# Patient Record
Sex: MALE | Race: WHITE
[De-identification: names, ages, dates, MRNs, and addresses within clinical notes are randomized per-mention and may not be internally consistent; named-entity substitution may affect disease eponyms.]

---

## 2018-04-12 ENCOUNTER — HOSPITAL ENCOUNTER (OUTPATIENT)
Dept: HOSPITAL 52 - LL.SDS | Age: 83
Discharge: HOME | End: 2018-04-12
Attending: SURGERY
Payer: MEDICARE

## 2018-04-12 VITALS — DIASTOLIC BLOOD PRESSURE: 82 MMHG | SYSTOLIC BLOOD PRESSURE: 133 MMHG

## 2018-04-12 DIAGNOSIS — Z79.899: ICD-10-CM

## 2018-04-12 DIAGNOSIS — K64.8: ICD-10-CM

## 2018-04-12 DIAGNOSIS — E03.9: ICD-10-CM

## 2018-04-12 DIAGNOSIS — K63.5: Primary | ICD-10-CM

## 2018-04-12 DIAGNOSIS — Z96.652: ICD-10-CM

## 2018-04-12 DIAGNOSIS — Z79.82: ICD-10-CM

## 2018-04-12 DIAGNOSIS — K60.2: ICD-10-CM

## 2018-04-12 DIAGNOSIS — E78.5: ICD-10-CM

## 2018-04-12 DIAGNOSIS — Z98.890: ICD-10-CM

## 2018-04-12 PROCEDURE — 45388 COLONOSCOPY W/ABLATION: CPT

## 2018-04-12 PROCEDURE — 00812 ANES LWR INTST SCR COLSC: CPT

## 2018-04-12 NOTE — OR
Date of Procedure:  04/12/2018

 

PREOPERATIVE DIAGNOSIS:  Hematochezia.

 

POSTOPERATIVE DIAGNOSES:  Colon polyp, internal hemorrhoids, anal fissure.

 

OPERATIONS PERFORMED:  Colonoscopy with polyp ablation and anoscopy.

 

INDICATIONS FOR SURGERY:  This 87-year-old male has been having symptoms of

hematochezia.  He is referred for a colonoscopy.

 

FINDINGS:  In the patient's colon, single polyp was noted in the cecum.  This

was 3 to 4 mm in size, sessile in configuration, and benign in appearance.  The

patient's rectum had multiple small-to-medium sized internal hemorrhoids without

active bleeding.  Also, in the rectum, was noted a moderate-sized acute fissure

in the posterior midline.

 

DESCRIPTION OF PROCEDURE:  The patient was taken to the operating room.  He was

given intravenous sedation, and with him in the left lateral decubitus position,

digital rectal exam was performed showing no rectal masses.  The Olympus

colonoscope was inserted into the rectum.  Retroflexed examination of the rectal

canal was performed.  The scope was then carefully advanced under direct

visualization through the entire length of the colon until the cecum was

reached.  Cecal acquisition was confirmed by noting the normal internal cecal

anatomy including the appendiceal orifice and ileocecal valve.  In the cecum,

the above-described small polyp was noted.  This small polyp was destroyed with

the application of cautery using a cautery snare.  The scope was then slowly

withdrawn sequentially re-examining the colonic segments until the entire colon

and rectum had been fully examined.  An anoscope was selected and the patient's

anus was carefully examined with the anoscope and it was with this that the anal

fissure was identified.  The scope was removed and the patient then left the

operating room in satisfactory condition.

 

ESTIMATED BLOOD LOSS:  Zero.

 

COMPLICATIONS:  None.

 

PROGNOSIS:  Good.

 

BELLA Wiseman MD

DD:  04/12/2018 15:31:28

DT:  04/12/2018 15:56:49

Job #:  [id number]

## 2018-04-12 NOTE — PCM.PN
- General Info


Date of Service: 04/12/18





- Review of Systems


Systems Review Comment:: 





87-year-old male referred by Brianna Mata for colonoscopy. He has a history of 

rectal bleeding. He is medically stable to proceed today with no significant 

recent changes in his health status. His recent history and physical is 

reviewed. I have discussed the proposed colonoscopy with the patient. Risks 

such as but not limited to bleeding and GI injury reviewed. He appears to 

understand and agrees to proceed.





- Patient Data


Vitals - Most Recent: 


 Last Vital Signs











Temp  98.3 F   04/12/18 14:09


 


Pulse  51 L  04/12/18 14:09


 


Resp  20   04/12/18 14:09


 


BP  132/76   04/12/18 14:09


 


Pulse Ox  95   04/12/18 14:09











Weight - Most Recent: 63.503 kg


Med Orders - Current: 


 Current Medications





Lactated Ringer's (Ringers, Lactated)  1,000 mls @ 125 mls/hr IV ASDIRECTED BRIDGET


   Last Admin: 04/12/18 14:20 Dose:  125 mls/hr


Sodium Chloride (Saline Flush)  10 ml FLUSH ASDIRECTED PRN


   PRN Reason: Keep Vein Open





Discontinued Medications





Fentanyl (Sublimaze) Confirm Administered Dose 100 mcg .ROUTE .STK-MED ONE


   Stop: 04/12/18 07:39


Ketamine HCl (Ketalar) Confirm Administered Dose 500 mg .ROUTE .STK-MED ONE


   Stop: 04/12/18 07:39


Midazolam HCl (Versed 1 Mg/Ml) Confirm Administered Dose 2 mg .ROUTE .STK-MED 

ONE


   Stop: 04/12/18 07:39


Midazolam HCl (Versed 1 Mg/Ml) Confirm Administered Dose 2 mg .ROUTE .STK-MED 

ONE


   Stop: 04/12/18 14:43


Propofol (Diprivan  20 Ml) Confirm Administered Dose 200 mg .ROUTE .STK-MED ONE


   Stop: 04/12/18 07:40


Propofol (Diprivan  20 Ml) Confirm Administered Dose 400 mg .ROUTE .STK-MED ONE


   Stop: 04/12/18 14:43











- Problem List Review


Problem List Initiated/Reviewed/Updated: Yes





- Assessment


Assessment:: 





Hematochezia





- Plan


Plan:: 





Colonoscopy

## 2018-04-12 NOTE — PCM.OPNOTE
- General Post-Op/Procedure Note


Date of Surgery/Procedure: 04/12/18


Operative Procedure(s): Colonoscopy with polyp ablation.  Anoscopy


Findings: 





Small cecal polyp


Internal hemorrhoids


Anal fissure in the posterior midline


Pre Op Diagnosis: Hematochezia


Post-Op Diagnosis: Colon polyp.  Hemorrhoids.  Anal fissure


Anesthesia Technique: MAC


Primary Surgeon: Zach Wiseman


Pathology: 





none


Output, Urine Amount: 0


EBL in mLs: 0


Complications: None


Condition: Good

## 2018-09-12 ENCOUNTER — HOSPITAL ENCOUNTER (OUTPATIENT)
Dept: HOSPITAL 52 - LL.ED | Age: 83
Setting detail: OBSERVATION
Discharge: HOME | End: 2018-09-12
Attending: FAMILY MEDICINE | Admitting: EMERGENCY MEDICINE
Payer: MEDICARE

## 2018-09-12 VITALS — SYSTOLIC BLOOD PRESSURE: 132 MMHG | DIASTOLIC BLOOD PRESSURE: 58 MMHG

## 2018-09-12 DIAGNOSIS — R10.11: Primary | ICD-10-CM

## 2018-09-12 DIAGNOSIS — K62.89: ICD-10-CM

## 2018-09-12 LAB
CHLORIDE SERPL-SCNC: 101 MMOL/L (ref 98–107)
SODIUM SERPL-SCNC: 135 MMOL/L (ref 136–145)

## 2018-09-12 PROCEDURE — 74019 RADEX ABDOMEN 2 VIEWS: CPT

## 2018-09-12 PROCEDURE — 36415 COLL VENOUS BLD VENIPUNCTURE: CPT

## 2018-09-12 PROCEDURE — 81001 URINALYSIS AUTO W/SCOPE: CPT

## 2018-09-12 PROCEDURE — G0378 HOSPITAL OBSERVATION PER HR: HCPCS

## 2018-09-12 PROCEDURE — 82272 OCCULT BLD FECES 1-3 TESTS: CPT

## 2018-09-12 PROCEDURE — 85025 COMPLETE CBC W/AUTO DIFF WBC: CPT

## 2018-09-12 PROCEDURE — 99285 EMERGENCY DEPT VISIT HI MDM: CPT

## 2018-09-12 PROCEDURE — 80053 COMPREHEN METABOLIC PANEL: CPT

## 2018-09-12 PROCEDURE — 36000 PLACE NEEDLE IN VEIN: CPT

## 2018-09-12 PROCEDURE — 74177 CT ABD & PELVIS W/CONTRAST: CPT

## 2018-09-12 PROCEDURE — 76705 ECHO EXAM OF ABDOMEN: CPT

## 2018-09-12 NOTE — EDM.PDOC
ED HPI GENERAL MEDICAL PROBLEM





- General


Chief Complaint: Gastrointestinal Problem


Stated Complaint: abdominal pain started at 0400 today


Time Seen by Provider: 09/12/18 08:30


Source of Information: Reports: Patient


History Limitations: Reports: No Limitations





- History of Present Illness


INITIAL COMMENTS - FREE TEXT/NARRATIVE: 





Patient comes in with complaint of abdominal pain that started around 4 hours 

previously.  Cannot describe pain when asked if crampy or sharp in nature.  

Says it is constant.  





No accompanying other complaints.  Denies fevers/chills/nausea/bowel changes.  

Denies feeling constipated. Had last bowel movement yesterday afternoon. 


No HEENT/Resp/CV complaints.  No chest discomfort. Pain does not radiate toward 

chest or back. No changes in urination/hematuria. No neuro complaints. 


  ** Abdominal


Pain Score (Numeric/FACES): 8





- Related Data


 Allergies











Allergy/AdvReac Type Severity Reaction Status Date / Time


 


No Known Allergies Allergy   Verified 09/12/18 07:52











Home Meds: 


 Home Meds





Aspirin 81 mg PO DAILY 06/03/16 [History]


Doxazosin [Cardura] 4 mg PO BEDTIME 06/03/16 [History]


Levothyroxine Sodium [Synthroid] 25 mcg PO ACBREAKFAST 04/11/18 [History]


Mineral Oil/Petrolat/Phenyleph [Preparation H Oint] 1 applic RC ASDIRECTED PRN 

04/11/18 [History]


Omega-3/DHA/Epa/Fish Oil [Fish Oil 1,000 mg Softgel] 1 each PO DAILY 04/11/18 [

History]


Hydrocortisone [Hydrocortisone 2.5% Crm] 30 gm TOP BID #1 tube 04/12/18 [Rx]


Psyllium Husk/Aspartame [Metamucil Fiber Singles Packet] 3.4 gm PO DAILY 09/12/ 18 [History]











Past Medical History


HEENT History: Reports: Hard of Hearing, Impaired Vision


Other HEENT History: wears glasses.  has full upper detures, partial lower 

denture.  Bilateral hearing aides


Cardiovascular History: Reports: None


Respiratory History: Reports: None


Gastrointestinal History: Reports: None


Genitourinary History: Reports: Prostate Disorder


Musculoskeletal History: Reports: Arthritis


Neurological History: Reports: None


Psychiatric History: Reports: None


Endocrine/Metabolic History: Reports: Hypothyroidism


Hematologic History: Reports: None


Immunologic History: Reports: None


Oncologic (Cancer) History: Reports: None


Dermatologic History: Reports: None





- Past Surgical History


Male  Surgical History: Reports: Prostate Biopsy


Musculoskeletal Surgical History: Reports: Knee Replacement





Social & Family History





- Tobacco Use


Smoking Status *Q: Never Smoker


Second Hand Smoke Exposure: No





- Caffeine Use


Caffeine Use: Reports: Coffee





- Recreational Drug Use


Recreational Drug Use: No





ED ROS GENERAL





- Review of Systems


Review Of Systems: ROS reveals no pertinent complaints other than HPI.





ED EXAM, GI/ABD





- Physical Exam


Exam: See Below


Exam Limited By: No Limitations


General Appearance: Alert, WD/WN, Other (appears uncomfortable)


Eyes: Bilateral: Normal Appearance, EOMI


Ears: Normal External Exam


Nose: Normal Inspection


Throat/Mouth: Normal Lips, Normal Voice, No Airway Compromise


Head: Atraumatic, Normocephalic


Neck: Supple, Non-Tender


Respiratory/Chest: No Respiratory Distress, Lungs Clear, Normal Breath Sounds, 

No Accessory Muscle Use, Chest Non-Tender


Cardiovascular: Regular Rate, Rhythm, No Murmur


GI/Abdominal Exam: Normal Bowel Sounds, No Distention, No Abnormal Bruit, 

Guarding (mild guarding when palpated RUQ/epigastric area), Tender (RUQ/

epigastric region).  No: Rebound


 (Male) Exam: Deferred


Rectal (Males) Exam: Deferred


Back Exam: No: CVA Tenderness (L), CVA Tenderness (R)


Extremities: Normal Inspection


Neurological: Alert, Oriented, Normal Cognition, Other (equal tone/strength)


Psychiatric: Normal Affect, Normal Mood


Skin Exam: Warm, Intact, Normal Color





Course





- Vital Signs


Last Recorded V/S: 


 Last Vital Signs











Temp  36.6 C   09/12/18 07:51


 


Pulse  53 L  09/12/18 07:51


 


Resp  16   09/12/18 07:51


 


BP  155/76 H  09/12/18 07:51


 


Pulse Ox  93 L  09/12/18 07:51














- Orders/Labs/Meds


Orders: 


 Active Orders 24 hr











 Category Date Time Status


 


 Abdomen 2V AP Flat Upright [CR] Stat Exams  09/12/18 08:54 Taken


 


 Abdomen Pelvis w Cont [CT] Stat Exams  09/12/18 09:25 Ordered


 


 Iopamidol [Isovue-300 (61%)] Med  09/12/18 10:30 Once





 100 ml IVPUSH ONETIME ONE   


 


 Sodium Chloride 0.9% [Saline Flush] Med  09/12/18 09:42 Active





 10 ml FLUSH ASDIRECTED PRN   


 


 Saline Lock Insert [OM.PC] Routine Oth  09/12/18 09:42 Ordered








 Medication Orders





Iopamidol (Isovue-300 (61%))  100 ml IVPUSH ONETIME ONE


   Stop: 09/12/18 10:31


Sodium Chloride (Saline Flush)  10 ml FLUSH ASDIRECTED PRN


   PRN Reason: Keep Vein Open








Labs: 


 Laboratory Tests











  09/12/18 09/12/18 09/12/18 Range/Units





  07:59 08:05 08:05 


 


WBC    5.3  (4.0-10.2)  K/uL


 


RBC    4.68  (4.33-5.41)  M/uL


 


Hgb    15.1  (13.1-16.8)  g/dL


 


Hct    42.3  (39.0-49.0)  %


 


MCV    90.4  (84.0-98.0)  fL


 


MCH    32.3  (28.2-33.3)  pg


 


MCHC    35.7  (31.7-36.0)  g/dL


 


RDW    12.4  (11.2-14.1)  %


 


Plt Count    129 L  (150-350)  K/uL


 


Neut % (Auto)    69.9  (45.0-80.0)  %


 


Lymph % (Auto)    19.1  (10.0-50.0)  %


 


Mono % (Auto)    9.5  (2.0-14.0)  %


 


Eos % (Auto)    1.1  (0.0-5.0)  %


 


Baso % (Auto)    0.4  (0.0-2.0)  %


 


Neut # (Auto)    3.70  (1.40-7.00)  K/uL


 


Lymph # (Auto)    1.01  (0.50-3.50)  K/uL


 


Mono # (Auto)    0.50  (0.00-1.00)  K/uL


 


Eos # (Auto)    0.06  (0.00-0.50)  K/uL


 


Baso # (Auto)    0.02  (0.00-0.20)  K/uL


 


Sodium   135 L   (136-145)  mmol/L


 


Potassium   4.1   (3.5-5.1)  mmol/L


 


Chloride   101   ()  mmol/L


 


Carbon Dioxide   27.3   (21.0-32.0)  mmol/L


 


BUN   13   (7-18)  mg/dL


 


Creatinine   0.98   (0.51-1.17)  mg/dL


 


Est Cr Clr Drug Dosing   45.32   mL/min


 


Estimated GFR (MDRD)   > 60   mL/min


 


Glucose   117 H   ()  mg/dL


 


Calcium   8.9   (8.5-10.1)  mg/dL


 


Total Bilirubin   0.8   (0.2-1.0)  mg/dL


 


AST   27   (15-37)  U/L


 


ALT   30   (12-78)  U/L


 


Alkaline Phosphatase   78   ()  IU/L


 


Total Protein   7.2   (6.4-8.2)  g/dL


 


Albumin   3.5   (3.4-5.0)  g/dL


 


Specimen Type  Urinvoid    


 


Urine Color  Yellow    


 


Urine Appearance  Clear    


 


Urine pH  7.0    (5.0-9.0)  


 


Ur Specific Gravity  1.015    (1.005-1.030)  


 


Urine Protein  Negative    (NEGATIVE)  mg/dL


 


Urine Glucose (UA)  Negative    (NEGATIVE)  mg/dL


 


Urine Ketones  Negative    (NEGATIVE)  mg/dL


 


Urine Occult Blood  Negative    (NEGATIVE)  


 


Urine Nitrite  Negative    (NEGATIVE)  


 


Urine Bilirubin  Negative    (NEGATIVE)  


 


Urine Urobilinogen  0.2    (0.2-1.0)  E.U./dL


 


Ur Leukocyte Esterase  Negative    (NEGATIVE)  


 


Urine RBC  0-5    /HPF


 


Urine WBC  0-5    /HPF


 


Ur Epithelial Cells  Rare    /LPF


 


Urine Bacteria  Rare    (NONE TO FEW)  /HPF











Meds: 


Medications











Generic Name Dose Route Start Last Admin





  Trade Name Freq  PRN Reason Stop Dose Admin


 


Iopamidol  100 ml  09/12/18 10:30  





  Isovue-300 (61%)  IVPUSH  09/12/18 10:31  





  ONETIME ONE   





     





     





     





     


 


Sodium Chloride  10 ml  09/12/18 09:42  





  Saline Flush  FLUSH   





  ASDIRECTED PRN   





  Keep Vein Open   





     





     





     














Discontinued Medications














Generic Name Dose Route Start Last Admin





  Trade Name Freq  PRN Reason Stop Dose Admin


 


Ondansetron HCl  4 mg  09/12/18 09:23  09/12/18 09:28





  Zofran Odt  PO  09/12/18 09:24  4 mg





  ONETIME ONE   Administration





     





     





     





     


 


Oxycodone/Acetaminophen  1 tab  09/12/18 09:22  09/12/18 09:30





  Percocet 325-5 Mg  PO  09/12/18 09:23  1 tab





  ONETIME ONE   Administration





     





     





     





     














- Re-Assessments/Exams


Free Text/Narrative Re-Assessment/Exam: 





09/12/18 09:32


Labs unremarkable overall.  Abdominal film shows increased stool RUQ.  Given 

mild guarding/patient rating pain at a 9, abdominal CT ordered. Unable to 

schedule patient for US study this morning. 





09/12/18 09:57


Patient had emesis while trying to drink contrast. Plan at this time is to 

admit to floor on observation. Will reattempt to give PO contrast. Consider 

scheduling RUQ US later today to allow for more thorough evaluation. 





Departure





- Departure


Time of Disposition: 09:59


Disposition: Refer to Observation


Condition: Good


Clinical Impression: 


 Abdominal pain








- Discharge Information


*PRESCRIPTION DRUG MONITORING PROGRAM REVIEWED*: Not Applicable


*COPY OF PRESCRIPTION DRUG MONITORING REPORT IN PATIENT NII: Not Applicable


Referrals: 


Christina Curtis MD [Primary Care Provider] - 


Forms:  ED Department Discharge





- Problem List & Annotations


(1) Abdominal pain


SNOMED Code(s): 73118094


   Code(s): R10.9 - UNSPECIFIED ABDOMINAL PAIN   Status: Acute   Priority: High

   Onset Date: 09/12/18   Annotation/Comment:: RUQ present since about 4am this 

morning. Denies feeling constipated however there was a larger collection of 

stool noted in this area on plain xray. No fevers. Normal WBC and LFTs. Unable 

to obtain US study until midday. Will also continue to see if patient can drink 

contrast in order to have CT study of abdomen/pelvis.    





- Problem List Review


Problem List Initiated/Reviewed/Updated: Yes





- My Orders


Last 24 Hours: 


My Active Orders





09/12/18 08:54


Abdomen 2V AP Flat Upright [CR] Stat 





09/12/18 09:25


Abdomen Pelvis w Cont [CT] Stat 





09/12/18 09:42


Sodium Chloride 0.9% [Saline Flush]   10 ml FLUSH ASDIRECTED PRN 


Saline Lock Insert [OM.PC] Routine 





09/12/18 10:30


Iopamidol [Isovue-300 (61%)]   100 ml IVPUSH ONETIME ONE 














- Assessment/Plan


Admission H&P: Please use this note as an admission H&P


Last 24 Hours: 


My Active Orders





09/12/18 08:54


Abdomen 2V AP Flat Upright [CR] Stat 





09/12/18 09:25


Abdomen Pelvis w Cont [CT] Stat 





09/12/18 09:42


Sodium Chloride 0.9% [Saline Flush]   10 ml FLUSH ASDIRECTED PRN 


Saline Lock Insert [OM.PC] Routine 





09/12/18 10:30


Iopamidol [Isovue-300 (61%)]   100 ml IVPUSH ONETIME ONE 











Assessment:: 





RUQ abdominal pain.  May be due to constipation. Differential includes 

gallbladder disease, early VGE, diverticular disease. 


Plan: 





As above.

## 2018-09-12 NOTE — PCM.DCSUM1
**Discharge Summary





- Hospital Course


Brief History: Patient admitted observation due to RUQ pain complaint. This 

started this morning around 4am. Patient was planning to see primary provider 

today and was fasting for routine labs.


Diagnosis: Stroke: No





- Discharge Data


Discharge Date: 09/12/18


Discharge Disposition: Home, Self-Care 01


Condition: Good





- Discharge Diagnosis/Problem(s)


(1) Abdominal pain


SNOMED Code(s): 21509856


   ICD Code: R10.9 - UNSPECIFIED ABDOMINAL PAIN   Status: Acute   Priority: 

High   Current Visit: No   Onset Date: 09/12/18   Problem Details: Improved 

gradually throughout the day and currently pain-free. Able to tolerate dinner 

tray without issue. Negative RUQ US study.  CT of abdomen/pelvis showed small 

area of inflammed ileum per Radiology. Differential included viral etiology. 

WBC normal. No fevers. No further emesis once admitted to floor. No bowel 

changes.  UA and Chemistry unremarkable. Has history of intermittent chronic 

constipation.    





(2) Rectal nodule


SNOMED Code(s): 711731359, 365263027


   ICD Code: K62.89 - OTHER SPECIFIED DISEASES OF ANUS AND RECTUM   Status: 

Acute   Priority: Medium   Current Visit: Yes   Problem Details: Patient shared 

that he has noted some dark blood on TP as well as on stools intermittently 

over the past month.  Feels this may have been associated with passing hard 

constipated stools. Digital rectal exam performed and nodule noted on left side 

of center posteriorly. Non-tender. Review of records shows that last colonscopy 

was performed April and multiple small/medium internal hemorrhoids were noted 

at that time. This may be a thrombosed hemorrhoid or could represent prostate 

nodule.  Will have patient follow up this week with primary provider for 

further evaluation and referral as needed.    





- Patient Summary/Data


Complications: none


Hospital Course: 





Pain had improved from a 9 to a 4 by the time patient was moved from the ER to 

a room.  Pain continued to improve over the afternoon and finally became a "0". 

Patient kept NPO except for water until the afternoon.  Once CT and US results 

were available diet was advanced as tolerated. He was able to eat a regular 

meal at dinner time. Pain did not return. Vital signs remained stable.  No new 

symptoms noted. Suspect mild viral gastroenteritis given the small area of 

inflamed ileum noted on CT. However cannot rule out other potential causes. 

Patient did share that he has had some darker blood mixed in with stools at 

times (see above). Negative for occult blood tonight. Small nodule noted on 

rectal exam (see above).  Uncertain if it represents a small thrombosed 

hemorrhoid or new prostate nodule. Patient plans on following up with primary 

provider at [facility] within the next few days in regards to this.  He may need 

referral to specialty clinic for further evaluation. 





- Patient Instructions


Diet: Usual Diet as Tolerated


Activity: As Tolerated


Driving: Do Not Drive


Showering/Bathing: May Shower


Notify Provider of: Increased Pain


Other/Special Instructions: Follow up with [facility] later this week for recheck and 

in regards to the rectal bleeding.  You may try taking a dose of Magnesium 

Citrate if you continue to feel constipated and do not have a stool tomorrow.





- Discharge Plan


*PRESCRIPTION DRUG MONITORING PROGRAM REVIEWED*: Not Applicable


*COPY OF PRESCRIPTION DRUG MONITORING REPORT IN PATIENT NII: Not Applicable


Home Medications: 


 Home Meds





Aspirin 81 mg PO DAILY 06/03/16 [History]


Doxazosin [Cardura] 4 mg PO BEDTIME 06/03/16 [History]


Levothyroxine Sodium [Synthroid] 25 mcg PO ACBREAKFAST 04/11/18 [History]


Mineral Oil/Petrolat/Phenyleph [Preparation H Oint] 1 applic RC ASDIRECTED PRN 

04/11/18 [History]


Omega-3/DHA/Epa/Fish Oil [Fish Oil 1,000 mg Softgel] 1 each PO DAILY 04/11/18 [

History]


Hydrocortisone [Hydrocortisone 2.5% Crm] 30 gm TOP BID #1 tube 04/12/18 [Rx]


Psyllium Husk/Aspartame [Metamucil Fiber Singles Packet] 3.4 gm PO DAILY 09/12/ 18 [History]








Forms:  ED Department Discharge


Referrals: 


Sheets-Christina Chapa MD [Primary Care Provider] - 





- Discharge Summary/Plan Comment


DC Time >30 min.: No





- General Info


Date of Service: 09/12/18


Admission Dx/Problem (Free Text: 





RUQ pain


Subjective Update: 





Symptoms have resolved. 


Functional Status: Reports: Pain Controlled, Tolerating Diet, Ambulating, 

Urinating.  Denies: New Symptoms





- Review of Systems


General: Reports: No Symptoms


HEENT: Reports: Glasses


Pulmonary: Reports: No Symptoms


Cardiovascular: Reports: No Symptoms


Gastrointestinal: Reports: No Symptoms.  Denies: Abdominal Pain, Diarrhea, 

Difficulty Swallowing


Genitourinary: Reports: No Symptoms


Musculoskeletal: Reports: No Symptoms


Skin: Reports: No Symptoms


Neurological: Reports: No Symptoms


Psychiatric: Reports: No Symptoms





- Patient Data


Vitals - Most Recent: 


 Last Vital Signs











Temp  35.7 C   09/12/18 16:00


 


Pulse  57 L  09/12/18 16:00


 


Resp  16   09/12/18 16:00


 


BP  132/58 L  09/12/18 16:00


 


Pulse Ox  95   09/12/18 16:00











Weight - Most Recent: 64.229 kg


I&O - Last 24 hours: 


 Intake & Output











 09/12/18 09/12/18 09/12/18





 06:59 14:59 22:59


 


Intake Total   240


 


Balance   240











Lab Results - Last 24 hrs: 


 Laboratory Results - last 24 hr











  09/12/18 09/12/18 09/12/18 Range/Units





  07:59 08:05 08:05 


 


WBC    5.3  (4.0-10.2)  K/uL


 


RBC    4.68  (4.33-5.41)  M/uL


 


Hgb    15.1  (13.1-16.8)  g/dL


 


Hct    42.3  (39.0-49.0)  %


 


MCV    90.4  (84.0-98.0)  fL


 


MCH    32.3  (28.2-33.3)  pg


 


MCHC    35.7  (31.7-36.0)  g/dL


 


RDW    12.4  (11.2-14.1)  %


 


Plt Count    129 L  (150-350)  K/uL


 


Neut % (Auto)    69.9  (45.0-80.0)  %


 


Lymph % (Auto)    19.1  (10.0-50.0)  %


 


Mono % (Auto)    9.5  (2.0-14.0)  %


 


Eos % (Auto)    1.1  (0.0-5.0)  %


 


Baso % (Auto)    0.4  (0.0-2.0)  %


 


Neut # (Auto)    3.70  (1.40-7.00)  K/uL


 


Lymph # (Auto)    1.01  (0.50-3.50)  K/uL


 


Mono # (Auto)    0.50  (0.00-1.00)  K/uL


 


Eos # (Auto)    0.06  (0.00-0.50)  K/uL


 


Baso # (Auto)    0.02  (0.00-0.20)  K/uL


 


Sodium   135 L   (136-145)  mmol/L


 


Potassium   4.1   (3.5-5.1)  mmol/L


 


Chloride   101   ()  mmol/L


 


Carbon Dioxide   27.3   (21.0-32.0)  mmol/L


 


BUN   13   (7-18)  mg/dL


 


Creatinine   0.98   (0.51-1.17)  mg/dL


 


Est Cr Clr Drug Dosing   45.32   mL/min


 


Estimated GFR (MDRD)   > 60   mL/min


 


Glucose   117 H   ()  mg/dL


 


Calcium   8.9   (8.5-10.1)  mg/dL


 


Total Bilirubin   0.8   (0.2-1.0)  mg/dL


 


AST   27   (15-37)  U/L


 


ALT   30   (12-78)  U/L


 


Alkaline Phosphatase   78   ()  IU/L


 


Total Protein   7.2   (6.4-8.2)  g/dL


 


Albumin   3.5   (3.4-5.0)  g/dL


 


Specimen Type  Urinvoid    


 


Urine Color  Yellow    


 


Urine Appearance  Clear    


 


Urine pH  7.0    (5.0-9.0)  


 


Ur Specific Gravity  1.015    (1.005-1.030)  


 


Urine Protein  Negative    (NEGATIVE)  mg/dL


 


Urine Glucose (UA)  Negative    (NEGATIVE)  mg/dL


 


Urine Ketones  Negative    (NEGATIVE)  mg/dL


 


Urine Occult Blood  Negative    (NEGATIVE)  


 


Urine Nitrite  Negative    (NEGATIVE)  


 


Urine Bilirubin  Negative    (NEGATIVE)  


 


Urine Urobilinogen  0.2    (0.2-1.0)  E.U./dL


 


Ur Leukocyte Esterase  Negative    (NEGATIVE)  


 


Urine RBC  0-5    /HPF


 


Urine WBC  0-5    /HPF


 


Ur Epithelial Cells  Rare    /LPF


 


Urine Bacteria  Rare    (NONE TO FEW)  /HPF











Med Orders - Current: 


 Current Medications





Acetaminophen (Tylenol)  650 mg PO Q6H PRN


   PRN Reason: Pain


Aspirin (Aspirin)  81 mg PO DAILY BRIDGET


Doxazosin Mesylate (Cardura)  4 mg PO BEDTIME BRIDGET


Levothyroxine Sodium (Levothyroxine)  25 mcg PO ACBREAKFAST BRIDGET


Ondansetron HCl (Zofran)  4 mg IVPUSH Q6H PRN


   PRN Reason: Nausea/Vomiting


Phenyleph/Shark Oil/Min Oil/Petrol (Preparation H Oint)  1 gm RECTAL ASDIRECTED 

PRN


   PRN Reason: Hemorrhoids


Sodium Chloride (Saline Flush)  10 ml FLUSH ASDIRECTED PRN


   PRN Reason: Keep Vein Open


Sodium Chloride (Saline Flush)  10 ml FLUSH ASDIRECTED PRN


   PRN Reason: Keep Vein Open


Tramadol HCl (Ultram)  50 mg PO Q4H PRN


   PRN Reason: Pain





Discontinued Medications





Iopamidol (Isovue-300 (61%))  100 ml IVPUSH ONETIME ONE


   Stop: 09/12/18 10:31


   Last Admin: 09/12/18 11:56 Dose:  100 ml


Ondansetron HCl (Zofran Odt)  4 mg PO ONETIME ONE


   Stop: 09/12/18 09:24


   Last Admin: 09/12/18 09:28 Dose:  4 mg


Oxycodone/Acetaminophen (Percocet 325-5 Mg)  1 tab PO ONETIME ONE


   Stop: 09/12/18 09:23


   Last Admin: 09/12/18 09:30 Dose:  1 tab











- Exam


General: Reports: Alert, Oriented, Cooperative, No Acute Distress


HEENT: Reports: Pupils Equal, Pupils Reactive, EOMI, Mucous Membr. Moist/Pink


Neck: Reports: Supple


Lungs: Reports: Clear to Auscultation, Normal Respiratory Effort


Cardiovascular: Reports: Regular Rate, Regular Rhythm


GI/Abdominal Exam: Normal Bowel Sounds, Soft, Non-Tender, No Distention, No Mass


 (Male) Exam: Deferred


Rectal (Males) Exam: BPH, Heme - Stool, Prostate Nodule (vs thrombosed internal 

hemorrhoid).  No: Tenderness


Extremities: Normal Inspection


Skin: Reports: Warm, Dry, Intact


Neurological: Reports: No New Focal Deficit


Psy/Mental Status: Reports: Alert, Normal Affect, Normal Mood

## 2019-01-28 ENCOUNTER — TRANSFERRED RECORDS (OUTPATIENT)
Dept: HEALTH INFORMATION MANAGEMENT | Facility: CLINIC | Age: 84
End: 2019-01-28

## 2019-01-29 ENCOUNTER — TRANSFERRED RECORDS (OUTPATIENT)
Dept: HEALTH INFORMATION MANAGEMENT | Facility: CLINIC | Age: 84
End: 2019-01-29

## 2019-02-06 ENCOUNTER — TRANSFERRED RECORDS (OUTPATIENT)
Dept: HEALTH INFORMATION MANAGEMENT | Facility: CLINIC | Age: 84
End: 2019-02-06

## 2019-02-09 ENCOUNTER — HOSPITAL ENCOUNTER (EMERGENCY)
Dept: HOSPITAL 52 - LL.ED | Age: 84
Discharge: HOME | End: 2019-02-09
Payer: MEDICARE

## 2019-02-09 VITALS — DIASTOLIC BLOOD PRESSURE: 59 MMHG | SYSTOLIC BLOOD PRESSURE: 157 MMHG

## 2019-02-09 DIAGNOSIS — N99.89: Primary | ICD-10-CM

## 2019-02-09 DIAGNOSIS — Z79.899: ICD-10-CM

## 2019-02-09 DIAGNOSIS — R33.9: ICD-10-CM

## 2019-02-09 DIAGNOSIS — E03.9: ICD-10-CM

## 2019-02-09 NOTE — EDM.PDOC
ED HPI GENERAL MEDICAL PROBLEM





- General


Chief Complaint: Genitourinary Problem


Stated Complaint: Urinary retention/urge


Time Seen by Provider: 02/09/19 11:08


Source of Information: Reports: Patient, Family


History Limitations: Reports: No Limitations





- History of Present Illness


INITIAL COMMENTS - FREE TEXT/NARRATIVE: 





Patient complains of urinary retention after taking Goetz cath out yesterday 

post-procedure.  He had the Goetz in place for several days after undergoing 

scope in [place] by Urology.  He had a small tumor scraped from his urethra at 

that time. 


No fevers/chills.  Feels like his bladder if full.  Can only void small amount 

with straining. 


Denies any other acute complaints. 


  ** Lower Abdominal


Pain Score (Numeric/FACES): 7





- Related Data


 Allergies











Allergy/AdvReac Type Severity Reaction Status Date / Time


 


No Known Allergies Allergy   Verified 02/09/19 10:58











Home Meds: 


 Home Meds





Doxazosin [Cardura] 4 mg PO BEDTIME 06/03/16 [History]


Levothyroxine Sodium [Synthroid] 25 mcg PO ACBREAKFAST 04/11/18 [History]


Mineral Oil/Petrolat/Phenyleph [Preparation H Oint] 1 applic RC ASDIRECTED PRN 

04/11/18 [History]


Omega-3/DHA/Epa/Fish Oil [Fish Oil 1,000 mg Softgel] 1 each PO DAILY 04/11/18 [

History]


Dutasteride [Avodart] 0.5 mg PO DAILY #30 cap 12/12/18 [Rx]











Past Medical History


HEENT History: Reports: Hard of Hearing


Other HEENT History: wears glasses.  has full upper detures, partial lower 

denture.  Bilateral hearing aides


Cardiovascular History: Reports: High Cholesterol, Other (See Below)


Other Cardiovascular History: First degree AV block.  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 

infarc


Respiratory History: Reports: COPD, Other (See Below)


Other Respiratory History: Left lung nodules x2


Gastrointestinal History: Reports: GERD, Hemorrhoids, Other (See Below)


Other Gastrointestinal History: Elevated bilirubin.  Blood in stool.  Anal 

fissure


Genitourinary History: Reports: BPH, Other (See Below)


Other Genitourinary History: Gross hematuria.  Small growth in urethra removed 

Feb 2019


Musculoskeletal History: Reports: Arthritis, Osteoporosis


Neurological History: Reports: None


Psychiatric History: Reports: None


Endocrine/Metabolic History: Reports: Hypothyroidism, Other (See Below)


Other Endocrine/Metabolic History: Hyperglycemia


Hematologic History: Reports: None


Immunologic History: Reports: None


Oncologic (Cancer) History: Reports: None


Dermatologic History: Reports: Other (See Below)


Other Dermatologic History: Other seborrheic keratosis





- Past Surgical History


HEENT Surgical History: Reports: Other (See Below)


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 

upper ear


GI Surgical History: Reports: Colonoscopy, Hernia Repair/Other


Male  Surgical History: Reports: Prostate Biopsy


Musculoskeletal Surgical History: Reports: Knee Replacement





Social & Family History





- Tobacco Use


Smoking Status *Q: Never Smoker


Second Hand Smoke Exposure: No





- Caffeine Use


Caffeine Use: Reports: Coffee





- Recreational Drug Use


Recreational Drug Use: No





ED ROS GENERAL





- Review of Systems


Review Of Systems: See Below


Constitutional: Reports: No Symptoms


HEENT: Reports: No Symptoms, Glasses


Respiratory: Reports: No Symptoms


Cardiovascular: Reports: No Symptoms


GI/Abdominal: Reports: No Symptoms.  Denies: Abdominal Pain, Constipation, 

Diarrhea, Nausea, Vomiting


: Reports: Other (see HPI)


Musculoskeletal: Reports: No Symptoms (no acute changes from baseline)


Skin: Reports: No Symptoms


Neurological: Reports: No Symptoms


Psychiatric: Reports: No Symptoms





ED EXAM, RENAL/





- Physical Exam


Exam: See Below


Exam Limited By: No Limitations


General Appearance: Alert, WD/WN, No Apparent Distress


Eye Exam: Bilateral Eye: EOMI, PERRL


Nose: No: Nasal Drainage


Throat/Mouth: Normal Voice, No Airway Compromise


Head: Atraumatic, Normocephalic


Neck: Supple


Respiratory/Chest: No Respiratory Distress, No Accessory Muscle Use, Chest Non-

Tender, Other (faint rales at bases)


Cardiovascular: Regular Rate, Rhythm, No Murmur


GI/Abdominal: Normal Bowel Sounds, Soft, Non-Tender


 (Male) Exam: Other (normal external male genitalia, no swelling noted)


Rectal (Males) Exam: Deferred


Extremities: Normal Capillary Refill


Neurological: Alert, Normal Cognition


Psychiatric: Normal Affect, Normal Mood


Skin Exam: Warm, Dry





Course





- Vital Signs


Last Recorded V/S: 


 Last Vital Signs











Temp  36.7 C   02/09/19 11:37


 


Pulse  60   02/09/19 11:37


 


Resp  18   02/09/19 11:37


 


BP  157/59 H  02/09/19 11:37


 


Pulse Ox  97   02/09/19 11:37














- Orders/Labs/Meds


Labs: 


 Laboratory Tests











  02/09/19 02/09/19 Range/Units





  11:07 11:20 


 


Specimen Type  Cancelled  Urincath  


 


Urine Color  Cancelled  Bebe  


 


Urine Appearance  Cancelled  Slightly cloudy  


 


Urine pH  Cancelled  6.5  


 


Ur Specific Gravity  Cancelled  1.010  


 


Urine Protein  Cancelled  Negative  


 


Urine Glucose (UA)  Cancelled  Negative  


 


Urine Ketones  Cancelled  Negative  


 


Urine Occult Blood  Cancelled  Large H  


 


Urine Nitrite  Cancelled  Negative  


 


Urine Bilirubin  Cancelled  Negative  


 


Urine Urobilinogen  Cancelled  0.2  


 


Ur Leukocyte Esterase  Cancelled  Trace H  


 


Urine RBC  Cancelled  20-30 H  


 


Urine WBC  Cancelled  0-5  


 


Ur Epithelial Cells  Cancelled  Occasional  


 


Other Crystals  Cancelled   


 


Amorphous Sediment  Cancelled   


 


Urine Bacteria  Cancelled  Occasional  


 


Hyaline Casts  Cancelled   


 


Granular Casts  Cancelled   


 


Urine Mucus  Cancelled   


 


Urine Other  Cancelled   


 


Urine Yeast  Cancelled   


 


Urinalysis Comment  Cancelled   














- Re-Assessments/Exams


Free Text/Narrative Re-Assessment/Exam: 





02/09/19 12:08


Bladder scan showed approximately 1liter of retained urine. This was drained 

after placement of new Goetz.  UA showed some RBCs, but no evidence of UTI. 





Plan at this time is to have the patient keep the catheter in place for two 

days. To call patient's Urology office early Monday morning to update them.  

They are to follow Urology guidance as to when to again attempt to remove the 

Goetz. Suspect that patient will nee to keep this one in place 2-3 days.


Precautions reviewed. To follow up here in [place] as needed. 





Departure





- Departure


Time of Disposition: 11:58


Disposition: Home, Self-Care 01


Condition: Good


Clinical Impression: 


 Postoperative urinary retention








- Discharge Information


*PRESCRIPTION DRUG MONITORING PROGRAM REVIEWED*: Not Applicable


*COPY OF PRESCRIPTION DRUG MONITORING REPORT IN PATIENT NII: Not Applicable


Instructions:  Acute Urinary Retention, Male, Easy-to-Read


Referrals: 


Brianna Mata PA [Primary Care Provider] - 


Forms:  ED Department Discharge


Additional Instructions: 


We will have you keep the indwelling catheter in place for the next 48 hours. 


Call the Urology department in [place] Monday morning and inform them of the need 

to replace the Goetz.  Ask them how long they wish for you to keep this one in 

place.  They may have you take it out Monday or Tuesday most likely.


If you have any problems or concerns feel free to return to [place] for a 

recheck.

## 2019-02-27 ENCOUNTER — TRANSFERRED RECORDS (OUTPATIENT)
Dept: HEALTH INFORMATION MANAGEMENT | Facility: CLINIC | Age: 84
End: 2019-02-27

## 2019-05-02 ENCOUNTER — TRANSFERRED RECORDS (OUTPATIENT)
Dept: HEALTH INFORMATION MANAGEMENT | Facility: CLINIC | Age: 84
End: 2019-05-02

## 2019-06-21 ENCOUNTER — TRANSFERRED RECORDS (OUTPATIENT)
Dept: HEALTH INFORMATION MANAGEMENT | Facility: CLINIC | Age: 84
End: 2019-06-21

## 2019-07-17 NOTE — TELEPHONE ENCOUNTER
I spoke to Melvina (his daughter, not wife) and she chose to schedule him for 8/19/19 with Dr Castro at the [facility]. She said they are not available to come on 7/29/19 due to other appts, and the week of August 19th works best for them.    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     Has the patient been given a timeframe or date/time of appt? No- per Dr. Castro schedule on 7/29/19     Is this appointment held on the schedule (Hold will be removed once appt is scheduled)?: No     Is there any additional testing/work up needed prior to visit? No     Has the patient been notified of diagnosis and appointment referral? Yes     Were the records sent directly to clinic? NO     Has patient been seen for any external appt for this diagnosis (enter clinic/location) that records should be gathered from? Yes, [facility], and possibly at a clinic in [place] as well. Please ask when you call to register the patient     ADDITIONAL INFORMATION: Below is an email we received.     The patient's wife, Melvina (physician) is interested in meeting him. She can be reached at   [phone].       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 They are located in [place] and did meet a urologist, and was started on Lupron with good response in PSA from 16 to 4.

## 2019-07-19 NOTE — TELEPHONE ENCOUNTER
RECORDS STATUS - ALL OTHER DIAGNOSIS      RECORDS RECEIVED FROM: [facility]   DATE RECEIVED: PENDING   NOTES STATUS DETAILS   OFFICE NOTE from referring provider     OFFICE NOTE from medical oncologist     DISCHARGE SUMMARY from hospital     DISCHARGE REPORT from the ER     OPERATIVE REPORT     MEDICATION LIST     CLINICAL TRIAL TREATMENTS TO DATE     LABS     PATHOLOGY REPORTS     ANYTHING RELATED TO DIAGNOSIS     GENONOMIC TESTING     TYPE:     IMAGING (NEED IMAGES & REPORT)     CT SCANS     MRI     MAMMO     ULTRASOUND     PET       Action [facility]   Action Taken REQUEST SENT FOR MR, IMAGING AND BX CDK 07/19/2019

## 2019-08-13 ENCOUNTER — PATIENT OUTREACH (OUTPATIENT)
Dept: CARE COORDINATION | Facility: CLINIC | Age: 84
End: 2019-08-13

## 2019-08-13 NOTE — PROGRESS NOTES
Per Patient's request,  completed and faxed [employer] request for lodging dates 8/18-8/20. [facility] will contact Patient for confirmation of reservation.  will continue to provide support as needed.    Soo Yeon Han, [facility]  Pager:  [phone]

## 2019-08-14 PROCEDURE — 00000346 ZZHCL STATISTIC REVIEW OUTSIDE SLIDES TC 88321: Performed by: INTERNAL MEDICINE

## 2019-08-16 ENCOUNTER — MEDICAL CORRESPONDENCE (OUTPATIENT)
Dept: HEALTH INFORMATION MANAGEMENT | Facility: CLINIC | Age: 84
End: 2019-08-16

## 2019-08-16 LAB — COPATH REPORT: NORMAL

## 2019-08-19 ENCOUNTER — ONCOLOGY VISIT (OUTPATIENT)
Dept: ONCOLOGY | Facility: CLINIC | Age: 84
End: 2019-08-19
Attending: INTERNAL MEDICINE
Payer: MEDICARE

## 2019-08-19 ENCOUNTER — PRE VISIT (OUTPATIENT)
Dept: ONCOLOGY | Facility: CLINIC | Age: 84
End: 2019-08-19

## 2019-08-19 VITALS
OXYGEN SATURATION: 96 % | DIASTOLIC BLOOD PRESSURE: 79 MMHG | TEMPERATURE: 98.2 F | WEIGHT: 142 LBS | BODY MASS INDEX: 26.13 KG/M2 | HEART RATE: 59 BPM | HEIGHT: 62 IN | SYSTOLIC BLOOD PRESSURE: 147 MMHG

## 2019-08-19 DIAGNOSIS — C61 PROSTATE CANCER (H): ICD-10-CM

## 2019-08-19 DIAGNOSIS — R33.9 URINARY RETENTION WITH INCOMPLETE BLADDER EMPTYING: Primary | ICD-10-CM

## 2019-08-19 PROCEDURE — 99205 OFFICE O/P NEW HI 60 MIN: CPT | Mod: GC | Performed by: INTERNAL MEDICINE

## 2019-08-19 PROCEDURE — G0463 HOSPITAL OUTPT CLINIC VISIT: HCPCS | Mod: ZF

## 2019-08-19 RX ORDER — OMEGA-3-ACID ETHYL ESTERS 900 MG/1
CAPSULE, LIQUID FILLED ORAL
COMMUNITY
End: 2021-06-07

## 2019-08-19 RX ORDER — CHLORAL HYDRATE 500 MG
1000 CAPSULE ORAL
COMMUNITY
Start: 2013-05-16

## 2019-08-19 RX ORDER — DUTASTERIDE 0.5 MG/1
1 CAPSULE, LIQUID FILLED ORAL DAILY
Refills: 11 | COMMUNITY
Start: 2019-07-23 | End: 2021-06-07

## 2019-08-19 RX ORDER — LEVOTHYROXINE SODIUM 25 UG/1
25 TABLET ORAL
COMMUNITY

## 2019-08-19 RX ORDER — TAMSULOSIN HYDROCHLORIDE 0.4 MG/1
0.4 CAPSULE ORAL DAILY
Status: DISCONTINUED | OUTPATIENT
Start: 2019-08-19 | End: 2019-08-19 | Stop reason: CLARIF

## 2019-08-19 RX ORDER — TAMSULOSIN HYDROCHLORIDE 0.4 MG/1
0.4 CAPSULE ORAL DAILY
Qty: 30 CAPSULE | Refills: 3 | Status: SHIPPED | OUTPATIENT
Start: 2019-08-19 | End: 2021-06-07

## 2019-08-19 SDOH — HEALTH STABILITY: MENTAL HEALTH: HOW OFTEN DO YOU HAVE A DRINK CONTAINING ALCOHOL?: MONTHLY OR LESS

## 2019-08-19 ASSESSMENT — PAIN SCALES - GENERAL: PAINLEVEL: NO PAIN (0)

## 2019-08-19 ASSESSMENT — MIFFLIN-ST. JEOR: SCORE: 1191.61

## 2019-08-19 NOTE — LETTER
8/19/2019       RE: Lb Fuentes  [id number] [address] [place] [id number]     Dear Colleague,    Thank you for referring your patient, Lb Fuentes, to the [facility]. Please see a copy of my visit note below.    Oncology New Consult          Lb Fuentes MRN# [id number]   Age: 89 year old YOB: 1930   DOS: 8/19/2019    Reason for consult: 2nd opinion regarding prostate cancer    HPI / COURSE  Lb Fuentes is a 89 year old yo male with PMH of hypothyroidism and recently diagnosed prostate cancer, who presents for a second opinion on prostate cancer management.    Mr. Fuentes initially noted elevated PSA (~10?) many years ago. He had prostate biopsy about 10 years ago. Pathology was negative for cancer per patient and family.    He noticed gross hematuria about 1 year ago. It gradually got worse. He decided to seek medical attention. PSA was 15.03 on 12/6/2018. He was evaluated by urologist Dr. Jennings, who noticed a prostate nodule of 1 1/2 cm on left lobe.    12/10/18: CT A/P showed nonspecific hypoenhancement of the left aspect of the corpus spongiosum.    He was started on Avodart (dutasteride). He continued to have hematuria.    1/28/19: He decided to see a different urologist Dr. Garcia for 2nd opinion. Cystoscopy was performed and found evidence of tumor in the the proximal penile urethra.    2/6/18:   He then underwent cystoscopy and transurethral resection of urethral tumor. Final pathology revealed [place] 4+4 prostatic adenocarcinoma, grade group 4, with lymphovascular space invasion.    2/27/2019: Bone scan showed no convincing bone mets. He does have diffuse DJD. He received Lupron 45 mg on the same day.    5/2/2019 Saw medical oncologist Dr. Nova Falk, who recommended continuing Lupron with close observation. PSA has come down nicely.    PSA:  12/6/2018: 15.03  2/27/2019: 16.19  4/16/2019: 8.16    Daughter Melvina is a physician and would like Mr. Fuentes to see Dr. Basilia Holcomb at  [facility]. Dr. Holcomb recommended Dr. Castro given the shorter distance.  He is accompanied by his daughter Melvina and his wife Key.  He notes that he continues to have gross hematuria, which has improved.  He denies urinary retention.  He feels more fatigued since starting Lupron.  He has occasional hot flashes.  He feels that he is not as strong and has lost some muscle mass.  He continues to be active and stays very busy.  He continues to drive.  He plans a lot of trees.  He is wanting a book called ABC of living.  He denies fever or chills.  He has a chronic hearing loss and wears hearing aid.    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 Extensive exposure to 2nd hand smoke  1 sister breast cancer  2nd cousin-breast cancer    Breast cancer in multiple relatives     MEDICATIONS  Current Outpatient Rx   Medication Sig Dispense Refill     dutasteride (AVODART) 0.5 MG capsule Take 1 capsule by mouth daily  11     fish oil-omega-3 fatty acids 1000 MG capsule Take 1,000 mg by mouth       leuprolide (LUPRON DEPOT) 45 MG kit Inject 45 mg into the muscle       levothyroxine (SYNTHROID/LEVOTHROID) 25 MCG tablet Take 25 mcg by mouth       Omega-3-acid Ethyl Esters (FISH OIL OMEGA-3 FATTY ACID) 320MG/ML oral suspension          (Not in a hospital admission)  Current Outpatient Medications   Medication Sig Dispense Refill     dutasteride (AVODART) 0.5 MG capsule Take 1 capsule by mouth daily  11     fish oil-omega-3 fatty acids 1000 MG capsule Take 1,000 mg by mouth       leuprolide (LUPRON  "DEPOT) 45 MG kit Inject 45 mg into the muscle       levothyroxine (SYNTHROID/LEVOTHROID) 25 MCG tablet Take 25 mcg by mouth       Omega-3-acid Ethyl Esters (FISH OIL OMEGA-3 FATTY ACID) 320MG/ML oral suspension          ALLERGIES  No Known Allergies    PHYSICAL EXAMINATION:  BP (!) 147/79   Pulse 59   Temp 98.2  F (36.8  C) (Oral)   Ht 1.58 m (5' 2.21\")   Wt 64.4 kg (142 lb)   SpO2 96%   BMI 25.80 kg/m     ECO~1  Constitutional: Awake and alert, elderly male, not in acute distress.  Eyes: No scleral icterus. Eyes exhibit no discharge.  ENT/Mouth: Oral mucosa pink and moist.  Wearing hearing aids.  Cardiovascular: Normal rate, regular rhythm, S1, S2. No murmur or rub. No LE edema.  Respiratory: No respiratory distress. Clear to auscultation bilaterally. No wheezes.  Gastrointestinal: Soft. No distension. No tenderness or garding.  Neurological: AAOX3, grossly non-focal  Psychiatric: Mentation and affect appear normal.  Skin: Skin is warm, not diaphoretic.  Hematologic/Lymphatic/Immunologic: No overt bleeding. No lymphadenopathy    DATA:  2018: 15.03  2019: 16.19  2019: 8.16    IMPRESSION/PLAN:  89-year-old male with newly diagnosed locally advanced castration sensitive prostate cancer.    1. Locally advanced castration sensitive prostate cancer, [place] 4+4 prostatic adenocarcinoma, grade group 4, involving the urethra.  We reviewed the diagnosis and pathophysiology of the disease.  There is no evidence of distant metastases at this point.  He is currently on Lupron and has an excellent response in PSA.  We agree with continuing androgen deprivation therapy.  However, he continues to have gross hematuria.  Therefore, we recommend a course of radiation for local disease control.  This can likely help with symptoms and potentially get him off ADT down the road. He plans to meet with a local radiation oncologist to talk about radiation therapy. He is currently on a 5 alpha-reductase inhibitor " dutasteride, that we recommend him to stop. Instead, we recommend tamsulosin 0.4mg daily.    Mr. Fuentes will continue to follow with his local oncologist Dr. Falk. He will get his second dose Lupron later this month. We would be happy to see him back in the winter.    Staffed w/ Dr. Matthew Dunham MD, PhD  Hem/Onc Fellow      Physician Attestation   I, Curtis Farrar MD, saw this patient and agree with the findings and plan of care as documented in the note.      Items personally reviewed/procedural attestation: vitals, labs, imaging and agree with the interpretation documented in the note and I had a very nice visit with this gentleman and his wife we reviewed the scans together and discussed the rationale for our decisions.  As per the above note I think that palliative radiation to the prostate is warranted.  It will improve symptoms reduce hematuria and may prolong life.  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    Curtis Farrar MD

## 2019-08-19 NOTE — PROGRESS NOTES
Oncology New Consult          Lb Fuentes MRN# [id number]   Age: 89 year old YOB: 1930   DOS: 8/19/2019    Reason for consult: 2nd opinion regarding prostate cancer    HPI / COURSE  Lb Fuentes is a 89 year old yo male with PMH of hypothyroidism and recently diagnosed prostate cancer, who presents for a second opinion on prostate cancer management.    Mr. Fuentes initially noted elevated PSA (~10?) many years ago. He had prostate biopsy about 10 years ago. Pathology was negative for cancer per patient and family.    He noticed gross hematuria about 1 year ago. It gradually got worse. He decided to seek medical attention. PSA was 15.03 on 12/6/2018. He was evaluated by urologist Dr. Jennings, who noticed a prostate nodule of 1 1/2 cm on left lobe.    12/10/18: CT A/P showed nonspecific hypoenhancement of the left aspect of the corpus spongiosum.    He was started on Avodart (dutasteride). He continued to have hematuria.    1/28/19: He decided to see a different urologist Dr. Garcia for 2nd opinion. Cystoscopy was performed and found evidence of tumor in the the proximal penile urethra.    2/6/18:   He then underwent cystoscopy and transurethral resection of urethral tumor. Final pathology revealed [place] 4+4 prostatic adenocarcinoma, grade group 4, with lymphovascular space invasion.    2/27/2019: Bone scan showed no convincing bone mets. He does have diffuse DJD. He received Lupron 45 mg on the same day.    5/2/2019 Saw medical oncologist Dr. Nova Falk, who recommended continuing Lupron with close observation. PSA has come down nicely.    PSA:  12/6/2018: 15.03  2/27/2019: 16.19  4/16/2019: 8.16    Daughter Melvina is a physician and would like Mr. Fuentes to see Dr. Basilia Holcomb at [facility]. Dr. Holcomb recommended Dr. Castro given the shorter distance.  He is accompanied by his daughter Melvina and his wife Key.  He notes that he continues to have gross hematuria, which has improved.  He denies  urinary retention.  He feels more fatigued since starting Lupron.  He has occasional hot flashes.  He feels that he is not as strong and has lost some muscle mass.  He continues to be active and stays very busy.  He continues to drive.  He plans a lot of trees.  He is wanting a book called ABC of living.  He denies fever or chills.  He has a chronic hearing loss and wears hearing aid.    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 Extensive exposure to 2nd hand smoke  1 sister breast cancer  2nd cousin-breast cancer    Breast cancer in multiple relatives     MEDICATIONS  Current Outpatient Rx   Medication Sig Dispense Refill     dutasteride (AVODART) 0.5 MG capsule Take 1 capsule by mouth daily  11     fish oil-omega-3 fatty acids 1000 MG capsule Take 1,000 mg by mouth       leuprolide (LUPRON DEPOT) 45 MG kit Inject 45 mg into the muscle       levothyroxine (SYNTHROID/LEVOTHROID) 25 MCG tablet Take 25 mcg by mouth       Omega-3-acid Ethyl Esters (FISH OIL OMEGA-3 FATTY ACID) 320MG/ML oral suspension          (Not in a hospital admission)  Current Outpatient Medications   Medication Sig Dispense Refill     dutasteride (AVODART) 0.5 MG capsule Take 1 capsule by mouth daily  11     fish oil-omega-3 fatty acids 1000 MG capsule Take 1,000 mg by mouth       leuprolide (LUPRON DEPOT) 45 MG kit Inject 45 mg into the muscle       levothyroxine (SYNTHROID/LEVOTHROID) 25 MCG tablet Take 25 mcg by mouth       Omega-3-acid Ethyl Esters (FISH OIL OMEGA-3 FATTY ACID) 320MG/ML oral suspension     "      ALLERGIES  No Known Allergies    PHYSICAL EXAMINATION:  BP (!) 147/79   Pulse 59   Temp 98.2  F (36.8  C) (Oral)   Ht 1.58 m (5' 2.21\")   Wt 64.4 kg (142 lb)   SpO2 96%   BMI 25.80 kg/m    ECO~1  Constitutional: Awake and alert, elderly male, not in acute distress.  Eyes: No scleral icterus. Eyes exhibit no discharge.  ENT/Mouth: Oral mucosa pink and moist.  Wearing hearing aids.  Cardiovascular: Normal rate, regular rhythm, S1, S2. No murmur or rub. No LE edema.  Respiratory: No respiratory distress. Clear to auscultation bilaterally. No wheezes.  Gastrointestinal: Soft. No distension. No tenderness or garding.  Neurological: AAOX3, grossly non-focal  Psychiatric: Mentation and affect appear normal.  Skin: Skin is warm, not diaphoretic.  Hematologic/Lymphatic/Immunologic: No overt bleeding. No lymphadenopathy    DATA:  2018: 15.03  2019: 16.19  2019: 8.16    IMPRESSION/PLAN:  89-year-old male with newly diagnosed locally advanced castration sensitive prostate cancer.    1. Locally advanced castration sensitive prostate cancer, [place] 4+4 prostatic adenocarcinoma, grade group 4, involving the urethra.  We reviewed the diagnosis and pathophysiology of the disease.  There is no evidence of distant metastases at this point.  He is currently on Lupron and has an excellent response in PSA.  We agree with continuing androgen deprivation therapy.  However, he continues to have gross hematuria.  Therefore, we recommend a course of radiation for local disease control.  This can likely help with symptoms and potentially get him off ADT down the road. He plans to meet with a local radiation oncologist to talk about radiation therapy. He is currently on a 5 alpha-reductase inhibitor dutasteride, that we recommend him to stop. Instead, we recommend tamsulosin 0.4mg daily.    Mr. Fuentes will continue to follow with his local oncologist Dr. Falk. He will get his second dose Lupron later this month. We " would be happy to see him back in the winter.    Staffed w/ Dr. Matthew Dunham MD, PhD  Hem/Onc Fellow      Physician Attestation   I, Curtis Farrar MD, saw this patient and agree with the findings and plan of care as documented in the note.      Items personally reviewed/procedural attestation: vitals, labs, imaging and agree with the interpretation documented in the note and I had a very nice visit with this gentleman and his wife we reviewed the scans together and discussed the rationale for our decisions.  As per the above note I think that palliative radiation to the prostate is warranted.  It will improve symptoms reduce hematuria and may prolong life.  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    Curtis Farrar MD

## 2020-03-11 ENCOUNTER — HEALTH MAINTENANCE LETTER (OUTPATIENT)
Age: 85
End: 2020-03-11

## 2021-01-03 ENCOUNTER — HEALTH MAINTENANCE LETTER (OUTPATIENT)
Age: 86
End: 2021-01-03

## 2021-04-25 ENCOUNTER — HEALTH MAINTENANCE LETTER (OUTPATIENT)
Age: 86
End: 2021-04-25

## 2021-06-07 ENCOUNTER — VIRTUAL VISIT (OUTPATIENT)
Dept: ONCOLOGY | Facility: CLINIC | Age: 86
End: 2021-06-07
Attending: INTERNAL MEDICINE
Payer: COMMERCIAL

## 2021-06-07 DIAGNOSIS — C61 PROSTATE CANCER (H): Primary | ICD-10-CM

## 2021-06-07 PROCEDURE — 999N001193 HC VIDEO/TELEPHONE VISIT; NO CHARGE

## 2021-06-07 PROCEDURE — 99214 OFFICE O/P EST MOD 30 MIN: CPT | Mod: 95 | Performed by: INTERNAL MEDICINE

## 2021-06-07 RX ORDER — DEXAMETHASONE 2 MG/1
TABLET ORAL
COMMUNITY
Start: 2021-06-03

## 2021-06-07 NOTE — LETTER
"    6/7/2021         RE: Lb Fuentes  [address] [address] [place] [id number]        Dear Colleague,    Thank you for referring your patient, Lb Fuentes, to the [facility]CER [facility]. Please see a copy of my visit note below.    NO note, there was a duplication that has been deleted              How would you like to obtain your AVS? Mail a copy would like copy sent to [facility] [employer] too  If the video visit is dropped, the invitation should be resent by: Text to cell phone: [phone]  Will anyone else be joining your video visit? No       I have reviewed and updated the patient's allergies and medication list.    Concerns: none  Refills: none     Vitals - Patient Reported  Weight (Patient Reported): 67.1 kg (148 lb)  Height (Patient Reported): 162.6 cm (5' 4\")  BMI (Based on Pt Reported Ht/Wt): 25.4  Pain Score: No Pain (0)    Chinyere Chan CMA          Video-Visit Details    Type of service:  Video Visit    25 minute video visit.     Originating Location (pt. Location): Home    Distant Location (provider location):  [facility] CANCER [facility]     Platform used for Video Visit: [employer]    [facility] CL[facility]IC  FOLLOW-UP VISIT NOTE  Date of visit: 6-7-21             Lb Fuentes MRN# [id number]   Age: 89 year old YOB: 1930   DOS: 6/7/21    Reason for consult: prostate cancer    HPI / COURSE  Lb Fuentes is a 89 year old yo male with PMH of hypothyroidism and recently diagnosed prostate cancer, who presents for a second opinion on prostate cancer management.    Mr. Fuentes initially noted elevated PSA (~10?) many years ago. He had prostate biopsy about 10 years ago. Pathology was negative for cancer per patient and family.    He noticed gross hematuria about 1 year ago. It gradually got worse. He decided to seek medical attention. PSA was 15.03 on 12/6/2018. He was evaluated by urologist Dr. Jennings, who noticed a prostate nodule of 1 1/2 cm on left " lobe.    12/10/18: CT A/P showed nonspecific hypoenhancement of the left aspect of the corpus spongiosum.    He was started on Avodart (dutasteride). He continued to have hematuria.    1/28/19: He decided to see a different urologist Dr. Garcia for 2nd opinion. Cystoscopy was performed and found evidence of tumor in the the proximal penile urethra.    2/6/18:   He then underwent cystoscopy and transurethral resection of urethral tumor. Final pathology revealed [place] 4+4 prostatic adenocarcinoma, grade group 4, with lymphovascular space invasion.    2/27/2019: Bone scan showed no convincing bone mets. He does have diffuse DJD. He received Lupron 45 mg on the same day.    5/2/2019 Saw medical oncologist Dr. Nova Falk, who recommended continuing Lupron with close observation. PSA has come down nicely.    PSA:  12/6/2018: 15.03  2/27/2019: 16.19  4/16/2019: 8.16    Daughter Melvina is a physician and would like Mr. Fuentes to see Dr. Basilia Holcomb at [facility]. Dr. Holcomb recommended Dr. Castro given the shorter distance.  He is accompanied by his daughter Melvina and his wife Key.    He feels more fatigued since starting Lupron.  He has occasional hot flashes.  He feels that he is not as strong and has lost some muscle mass.  He continues to be active and stays very busy.  He continues to drive.  He plants a lot of trees.  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  He denies fever or chills.  He has a chronic hearing loss and wears hearing aid.    11/2019 Finished RT to penile mass  18 months PSA undetectable.   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   ON Dexamethasone and getting RT    2/19/20 PSA <0.1  5/27/20 PSA = 0.08  11/5/20202 PSA =0.17  5/7/21  Biopsy ofr Right Sacrum consistent with adenocarcinoma of the prostate  5/24/21 PSA =0.96  Testosterone =201  Starting Casodex tonight and planning lupron in one week    ROS: A complete ROS was otherwise negative    PAST MEDICAL  HISTORY  Prostate cancer as per HPI  Hypothyroidism    PAST MEDICAL HISTORY  Knee replacement surgery  Transurethral resection of urethral tumor  Prostate biopsy     SOCIAL / FAMILY HISTORY   to Key for 59 years  2 sons and 3 daughters, no known history of cancer  Life long non-smoker  No EOTH  Retired Farmer    Father  in an accident  3 brothers, 1 brother  in MVA  2 sisters  1 sister lung cancer. Extensive exposure to 2nd hand smoke  1 sister breast cancer  2nd cousin-breast cancer    Breast cancer in multiple relatives     MEDICATIONS  Current Outpatient Rx   Medication Sig Dispense Refill     dexamethasone (DECADRON) 2 MG tablet        fish oil-omega-3 fatty acids 1000 MG capsule Take 1,000 mg by mouth       levothyroxine (SYNTHROID/LEVOTHROID) 25 MCG tablet Take 25 mcg by mouth       (Not in a hospital admission)    Current Outpatient Medications   Medication Sig Dispense Refill     dexamethasone (DECADRON) 2 MG tablet        fish oil-omega-3 fatty acids 1000 MG capsule Take 1,000 mg by mouth       levothyroxine (SYNTHROID/LEVOTHROID) 25 MCG tablet Take 25 mcg by mouth         ALLERGIES  No Known Allergies    PHYSICAL EXAMINATION:  There were no vitals taken for this visit.  ECO~1  Constitutional: Awake and alert, elderly male, not in acute distress.  Eyes: No scleral icterus. Eyes exhibit no discharge.  ENT/Mouth: Oral mucosa pink and moist.  Wearing hearing aids.  Cardiovascular: Normal rate, regular rhythm, S1, S2. No murmur or rub. No LE edema.  Respiratory: No respiratory distress. Clear to auscultation bilaterally. No wheezes.  Gastrointestinal: Soft. No distension. No tenderness or garding.  Neurological: AAOX3, grossly non-focal  Psychiatric: Mentation and affect appear normal.  Skin: Skin is warm, not diaphoretic.  Hematologic/Lymphatic/Immunologic: No overt bleeding. No lymphadenopathy    DATA:  2018: 15.03  2019: 16.19  2019: 8.16      21 PET  SCAN  IMPRESSION:   1. Local recurrent prostate malignancy within the enlarged prostate gland.    2. Lobulated radiotracer activity in the penis bilaterally concerning for corpora metastases versus inflammatory etiology.    3. Pulmonary and hepatic metastases. While prostate metastases are suspected, tissue sampling is recommended to exclude a second cellular process.    4. Multifocal radiotracer avid, lytic osseous metastases including the biopsied right sacrum which indicated prostate source on pathology.        IMPRESSION/PLAN:  89-year-old male with newly diagnosed locally advanced castration sensitive prostate cancer.    1. Locally advanced castration sensitive prostate cancer, [place] 4+4 prostatic adenocarcinoma, grade group 4, involving the urethra.   ---I agree with restarting Casodex and Lupron for now.   ---Optimal outcome would come with addition of Abiraterone.   ---plan to check in with patient in 1 month or so and consider adding Abiraterone at that time.   ---They will discuss with local MD and may consider starting at low dose (e.g. 250 and titrate up as tolerated based on edema, K+, BP etc)              Again, thank you for allowing me to participate in the care of your patient.        Sincerely,        Darwin Castro MD

## 2021-06-08 NOTE — PROGRESS NOTES
"  How would you like to obtain your AVS? Mail a copy would like copy sent to [employer] too  If the video visit is dropped, the invitation should be resent by: Text to cell phone: [phone]  Will anyone else be joining your video visit? No       I have reviewed and updated the patient's allergies and medication list.    Concerns: none  Refills: none     Vitals - Patient Reported  Weight (Patient Reported): 67.1 kg (148 lb)  Height (Patient Reported): 162.6 cm (5' 4\")  BMI (Based on Pt Reported Ht/Wt): 25.4  Pain Score: No Pain (0)    Chinyere Chan CMA          Video-Visit Details    Type of service:  Video Visit    25 minute video visit.     Originating Location (pt. Location): Home    Distant Location (provider location):  [facility]ER CLINIC     Platform used for Video Visit: [employer]    [facility] CLINIC  FOLLOW-UP VISIT NOTE  Date of visit: 6-7-21             Lb Fuentes MRN# [id number]   Age: 89 year old YOB: 1930   DOS: 6/7/21    Reason for consult: prostate cancer    HPI / COURSE  Lb Fuentes is a 89 year old yo male with PMH of hypothyroidism and recently diagnosed prostate cancer, who presents for a second opinion on prostate cancer management.    Mr. Fuentes initially noted elevated PSA (~10?) many years ago. He had prostate biopsy about 10 years ago. Pathology was negative for cancer per patient and family.    He noticed gross hematuria about 1 year ago. It gradually got worse. He decided to seek medical attention. PSA was 15.03 on 12/6/2018. He was evaluated by urologist Dr. Jennings, who noticed a prostate nodule of 1 1/2 cm on left lobe.    12/10/18: CT A/P showed nonspecific hypoenhancement of the left aspect of the corpus spongiosum.    He was started on Avodart (dutasteride). He continued to have hematuria.    1/28/19: He decided to see a different urologist Dr. Garcia for 2nd opinion. Cystoscopy was performed and found evidence of tumor in " the the proximal penile urethra.    2/6/18:   He then underwent cystoscopy and transurethral resection of urethral tumor. Final pathology revealed [place] 4+4 prostatic adenocarcinoma, grade group 4, with lymphovascular space invasion.    2/27/2019: Bone scan showed no convincing bone mets. He does have diffuse DJD. He received Lupron 45 mg on the same day.    5/2/2019 Saw medical oncologist Dr. Nova Falk, who recommended continuing Lupron with close observation. PSA has come down nicely.    PSA:  12/6/2018: 15.03  2/27/2019: 16.19  4/16/2019: 8.16    Daughter Melvina is a physician and would like Mr. Fuentes to see Dr. Basilia Holcomb at [facility]. Dr. Holcomb recommended Dr. Castro given the shorter distance.  He is accompanied by his daughter Melvina and his wife Key.    He feels more fatigued since starting Lupron.  He has occasional hot flashes.  He feels that he is not as strong and has lost some muscle mass.  He continues to be active and stays very busy.  He continues to drive.  He plants a lot of trees.  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  He denies fever or chills.  He has a chronic hearing loss and wears hearing aid.    11/2019 Finished RT to penile mass  18 months PSA undetectable.   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   ON Dexamethasone and getting RT    2/19/20 PSA <0.1  5/27/20 PSA = 0.08  11/5/20202 PSA =0.17  5/7/21  Biopsy ofr Right Sacrum consistent with adenocarcinoma of the prostate  5/24/21 PSA =0.96  Testosterone =201  Starting Casodex tonight and planning lupron in one week    ROS: A complete ROS was otherwise negative    PAST MEDICAL HISTORY  Prostate cancer as per HPI  Hypothyroidism    PAST MEDICAL HISTORY  Knee replacement surgery  Transurethral resection of urethral tumor  Prostate biopsy     SOCIAL / FAMILY HISTORY   to Key for 59 years  2 sons and 3 daughters, no known history of cancer  Life long non-smoker  No EOTH  Retired  [employer]    Father  in an accident  3 brothers, 1 brother  in MVA  2 sisters  1 sister lung cancer. Extensive exposure to 2nd hand smoke  1 sister breast cancer  2nd cousin-breast cancer    Breast cancer in multiple relatives     MEDICATIONS  Current Outpatient Rx   Medication Sig Dispense Refill     dexamethasone (DECADRON) 2 MG tablet        fish oil-omega-3 fatty acids 1000 MG capsule Take 1,000 mg by mouth       levothyroxine (SYNTHROID/LEVOTHROID) 25 MCG tablet Take 25 mcg by mouth       (Not in a hospital admission)    Current Outpatient Medications   Medication Sig Dispense Refill     dexamethasone (DECADRON) 2 MG tablet        fish oil-omega-3 fatty acids 1000 MG capsule Take 1,000 mg by mouth       levothyroxine (SYNTHROID/LEVOTHROID) 25 MCG tablet Take 25 mcg by mouth         ALLERGIES  No Known Allergies    PHYSICAL EXAMINATION:  There were no vitals taken for this visit.  ECO~1  Constitutional: Awake and alert, elderly male, not in acute distress.  Eyes: No scleral icterus. Eyes exhibit no discharge.  ENT/Mouth: Oral mucosa pink and moist.  Wearing hearing aids.  Cardiovascular: Normal rate, regular rhythm, S1, S2. No murmur or rub. No LE edema.  Respiratory: No respiratory distress. Clear to auscultation bilaterally. No wheezes.  Gastrointestinal: Soft. No distension. No tenderness or garding.  Neurological: AAOX3, grossly non-focal  Psychiatric: Mentation and affect appear normal.  Skin: Skin is warm, not diaphoretic.  Hematologic/Lymphatic/Immunologic: No overt bleeding. No lymphadenopathy    DATA:  2018: 15.03  2019: 16.19  2019: 8.16      21 PET SCAN  IMPRESSION:   1. Local recurrent prostate malignancy within the enlarged prostate gland.    2. Lobulated radiotracer activity in the penis bilaterally concerning for corpora metastases versus inflammatory etiology.    3. Pulmonary and hepatic metastases. While prostate metastases are suspected, tissue sampling is  recommended to exclude a second cellular process.    4. Multifocal radiotracer avid, lytic osseous metastases including the biopsied right sacrum which indicated prostate source on pathology.        IMPRESSION/PLAN:  89-year-old male with newly diagnosed locally advanced castration sensitive prostate cancer.    1. Locally advanced castration sensitive prostate cancer, [place] 4+4 prostatic adenocarcinoma, grade group 4, involving the urethra.   ---I agree with restarting Casodex and Lupron for now.   ---Optimal outcome would come with addition of Abiraterone.   ---plan to check in with patient in 1 month or so and consider adding Abiraterone at that time.   ---They will discuss with local MD and may consider starting at low dose (e.g. 250 and titrate up as tolerated based on edema, K+, BP etc)

## 2021-10-10 ENCOUNTER — HEALTH MAINTENANCE LETTER (OUTPATIENT)
Age: 86
End: 2021-10-10

## 2022-05-21 ENCOUNTER — HEALTH MAINTENANCE LETTER (OUTPATIENT)
Age: 87
End: 2022-05-21

## 2022-09-18 ENCOUNTER — HEALTH MAINTENANCE LETTER (OUTPATIENT)
Age: 87
End: 2022-09-18

## 2023-06-04 ENCOUNTER — HEALTH MAINTENANCE LETTER (OUTPATIENT)
Age: 88
End: 2023-06-04